# Patient Record
Sex: FEMALE | Race: WHITE
[De-identification: names, ages, dates, MRNs, and addresses within clinical notes are randomized per-mention and may not be internally consistent; named-entity substitution may affect disease eponyms.]

---

## 2018-12-19 ENCOUNTER — HOSPITAL ENCOUNTER (OUTPATIENT)
Dept: HOSPITAL 39 - GMAE | Age: 63
End: 2018-12-19
Attending: FAMILY MEDICINE
Payer: COMMERCIAL

## 2018-12-19 DIAGNOSIS — M79.644: Primary | ICD-10-CM

## 2019-07-30 ENCOUNTER — HOSPITAL ENCOUNTER (OUTPATIENT)
Dept: HOSPITAL 39 - GMAE | Age: 64
End: 2019-07-30
Attending: FAMILY MEDICINE
Payer: COMMERCIAL

## 2019-07-30 DIAGNOSIS — Z00.00: Primary | ICD-10-CM

## 2019-09-11 ENCOUNTER — HOSPITAL ENCOUNTER (OUTPATIENT)
Dept: HOSPITAL 39 - US | Age: 64
End: 2019-09-11
Attending: FAMILY MEDICINE
Payer: COMMERCIAL

## 2019-09-11 DIAGNOSIS — E04.1: Primary | ICD-10-CM

## 2019-09-12 NOTE — US
US THYROID



CLINICAL STATEMENT: NONTOXIC SINGLE THYROID NODULE. 



COMPARISON: None



TECHNIQUE: Transcutaneous scanning, grayscale and Doppler modes. 



FINDINGS:

Size right thyroid lobe: 5.3 x 2.3 x 1.7 cm

Size left thyroid lobe: 4.2 x 1.2 x 1.4 cm

Size isthmus: 0.4 cm



Estimated total number of nodules greater than or equal to 1 cm:

None. Homogeneous. No large calcifications or abnormal

vascularity.





Nodule 1:

Size: 0.8 x 0.8 x 0.6 cm

Location: Right Upper

Composition: solid or almost completely solid: 2 points

Echogenicity: hyperechoic: 1 point

Shape: wider than tall: 0 points

Margins: smooth: 0 points

Echogenic foci: none: 0 points



ACR Total Points: 3;  ACR TI-RADS risk category: TR3 - mildly

suspicious nodule.





Nodule 2:

Size: 0.4 x 0.4 x 0.3 cm

Location: Right Mid

Composition: solid or almost completely solid: 2 points

Echogenicity: hyperechoic: 1 point

Shape: wider than tall: 0 points

Margins: smooth: 0 points

Echogenic foci: none: 0 points



ACR Total Points: 3;  ACR TI-RADS risk category: TR3 - mildly

suspicious nodule.





Nodule 3:

Size: 0.3 x 0.2 x 0.1 cm

Location: Left Upper

Composition: cystic or completely cystic: 0 points

Echogenicity: anechoic: 0 points

Shape: wider than tall: 0 points

Margins: smooth: 0 points

Echogenic foci: none: 0 points



ACR Total Points: 0;  ACR TI-RADS risk category:  TR1 - benign

nodule



Soft tissue around the thyroid gland is unremarkable.





IMPRESSION:

1.  Nodule 1: ACR TI-RADS 2017 Category TR3. Recommend: No

further follow-up..  Recommendations based upon [employer]

Practice recommendations and ACR TI-RADS 2017 guidelines. Please

see below*. 

2.  Nodule 2: ACR TI-RADS 2017 Category TR3. Recommend: No

further follow-up. 

3.  Nodule 3: ACR TI-RADS 2017 Category TR1. Recommend: No

further follow-up. 

4.  Soft tissue around the thyroid gland is unremarkable.





*ACR TI-RADS 2017 Recommendations for imaging follow-up of

nodules:



TR1: No FNA or follow up

TR2: No FNA or follow up

TR3: FNA if >/= 2.5 cm, follow up if 1.5 - 2.4 cm in 1, 3, and 5

years

TR4: FNA if >/= 1.5 cm, follow up if 1.0 - 1.4 cm in 1, 2, 3, and

5 years

TR5: FNA if >/= 1.0 cm, follow up if 0.5 - 0.9 cm every year for

5 years



**ACR TI-RADS recommends that no more than two nodules with the

highest ACR TI-RADS total point should be biopsied and no more

than four nodules should be followed.



These recommendations do not apply to patients with increased

risk for thyroid cancer or patients with symptomatic thyroid

disease.







Electronically signed by:  Darshan Melton MD  9/12/2019 5:29 PM CDT

Workstation: [id number]